# Patient Record
Sex: FEMALE
[De-identification: names, ages, dates, MRNs, and addresses within clinical notes are randomized per-mention and may not be internally consistent; named-entity substitution may affect disease eponyms.]

---

## 2023-03-16 PROBLEM — Z00.00 ENCOUNTER FOR PREVENTIVE HEALTH EXAMINATION: Status: ACTIVE | Noted: 2023-03-16

## 2023-03-17 ENCOUNTER — APPOINTMENT (OUTPATIENT)
Dept: MAMMOGRAPHY | Facility: CLINIC | Age: 53
End: 2023-03-17
Payer: COMMERCIAL

## 2023-03-17 ENCOUNTER — APPOINTMENT (OUTPATIENT)
Dept: ULTRASOUND IMAGING | Facility: CLINIC | Age: 53
End: 2023-03-17
Payer: COMMERCIAL

## 2023-03-17 PROCEDURE — 76641 ULTRASOUND BREAST COMPLETE: CPT | Mod: 50

## 2023-03-17 PROCEDURE — 77067 SCR MAMMO BI INCL CAD: CPT

## 2023-03-17 PROCEDURE — 77063 BREAST TOMOSYNTHESIS BI: CPT

## 2024-02-27 ENCOUNTER — APPOINTMENT (OUTPATIENT)
Dept: INTERVENTIONAL RADIOLOGY/VASCULAR | Facility: HOSPITAL | Age: 54
End: 2024-02-27

## 2024-02-27 VITALS
DIASTOLIC BLOOD PRESSURE: 72 MMHG | SYSTOLIC BLOOD PRESSURE: 115 MMHG | OXYGEN SATURATION: 99 % | TEMPERATURE: 98.2 F | HEART RATE: 71 BPM

## 2024-02-27 DIAGNOSIS — D25.9 LEIOMYOMA OF UTERUS, UNSPECIFIED: ICD-10-CM

## 2024-02-27 NOTE — HISTORY OF PRESENT ILLNESS
[FreeTextEntry1] : 53 year old female w/PMHx MS, legally blind, anemia referred for consultation regarding uterine artery embolization (UAE) for treatment of symptomatic fibroids. Patient reports heavy bleeding with periods requiring the use of multiple pads per day lasting ~5 days. Periods occur in regular frequency. Patient denies bleeding between periods or requiring blood transfusion. Patient denies associated pain. Patient also reports suprapubic distention and increased urinary frequency. Patient had a previous endometrial biopsy ~2021 which was unremarkable per patient, and has an upcoming endometrial biopsy next week. MRI reviewed - multiple fibroids, the largest of which measures ~13 cm. No appreciable intracavitary pedunculated fibroid/s. The technical aspects of UAE were discussed with the patient. The risks, including and not limited to bleeding, infection, off-target embolization, post-embolization syndrome, impaired fertility, premature menopause were discussed. Patient demonstrated understand and asked appropriate questions. Patient wishes to proceed with UAE scheduling pending further discussion with her OBGYN. Plan to proceed with UAE at patient's preference. [Menorrhagia] : ~T menorrhagia [Urinary Frequency] : urinary frequency [UAE] : no uterine artery embolization [Pressure] : pressure [Myomectomy] : no myomectomy [Monthly Cycles Regular] : monthly cycles are regular [Post Menopause] : not post menopausal [Bleeding In Between Periods] : no bleeding in between periods

## 2024-02-27 NOTE — CONSULT LETTER
[Consult Letter:] : I had the pleasure of evaluating your patient, [unfilled]. [Dear  ___] : Dear  [unfilled], [Consult Closing:] : Thank you very much for allowing me to participate in the care of this patient.  If you have any questions, please do not hesitate to contact me. [Please see my note below.] : Please see my note below. [FreeTextEntry3] : Gabriel Mehta MD [Sincerely,] : Sincerely,

## 2024-02-27 NOTE — ASSESSMENT
[FreeTextEntry1] : 53 year old female w/PMHx MS, legally blind, anemia referred for consultation regarding uterine artery embolization (UAE) for treatment of symptomatic fibroids (menorrhagia, bulk symptoms).  MRI reviewed - multiple fibroids, the largest of which measures ~13 cm.  Plan to proceed with UAE at patient's preference.

## 2024-02-27 NOTE — PHYSICAL EXAM
[Alert] : alert [No Acute Distress] : no acute distress [Normal Outer Ear/Nose] : the ears and nose were normal in appearance [Normal Sclera/Conjunctiva] : normal sclera/conjunctiva [No Respiratory Distress] : no respiratory distress [No Accessory Muscle Use] : no accessory muscle use [Normal Bowel Sounds] : normal bowel sounds [Normal Rate] : heart rate was normal  [No Involuntary Movements] : no involuntary movements were seen [No Motor Deficits] : the motor exam was normal [Oriented x3] : oriented to person, place, and time [Normal Insight/Judgement] : insight and judgment were intact [Fully active, able to carry on all pre-disease performance without restriction] : Fully active, able to carry on all pre-disease performance without restriction

## 2024-03-19 ENCOUNTER — APPOINTMENT (OUTPATIENT)
Dept: MAMMOGRAPHY | Facility: CLINIC | Age: 54
End: 2024-03-19
Payer: COMMERCIAL

## 2024-03-19 ENCOUNTER — APPOINTMENT (OUTPATIENT)
Dept: ULTRASOUND IMAGING | Facility: CLINIC | Age: 54
End: 2024-03-19
Payer: COMMERCIAL

## 2024-03-19 PROCEDURE — 77067 SCR MAMMO BI INCL CAD: CPT

## 2024-03-19 PROCEDURE — 77063 BREAST TOMOSYNTHESIS BI: CPT

## 2024-03-19 PROCEDURE — 76641 ULTRASOUND BREAST COMPLETE: CPT | Mod: 50

## 2024-06-28 ENCOUNTER — APPOINTMENT (OUTPATIENT)
Dept: INTERVENTIONAL RADIOLOGY/VASCULAR | Facility: HOSPITAL | Age: 54
End: 2024-06-28

## 2024-07-03 ENCOUNTER — APPOINTMENT (OUTPATIENT)
Dept: INTERVENTIONAL RADIOLOGY/VASCULAR | Facility: HOSPITAL | Age: 54
End: 2024-07-03

## 2024-10-14 ENCOUNTER — APPOINTMENT (OUTPATIENT)
Dept: MRI IMAGING | Facility: HOSPITAL | Age: 54
End: 2024-10-14

## 2024-10-16 ENCOUNTER — APPOINTMENT (OUTPATIENT)
Dept: INTERVENTIONAL RADIOLOGY/VASCULAR | Facility: HOSPITAL | Age: 54
End: 2024-10-16